# Patient Record
Sex: MALE | Race: WHITE | URBAN - METROPOLITAN AREA
[De-identification: names, ages, dates, MRNs, and addresses within clinical notes are randomized per-mention and may not be internally consistent; named-entity substitution may affect disease eponyms.]

---

## 2020-12-07 ENCOUNTER — TELEPHONE (OUTPATIENT)
Dept: FAMILY MEDICINE CLINIC | Age: 65
End: 2020-12-07

## 2020-12-07 NOTE — TELEPHONE ENCOUNTER
Pt calling back he is requesting the results be sent to     [address] [facility] [address], [address] [facility]

## 2020-12-07 NOTE — TELEPHONE ENCOUNTER
CALLED PT IN REGARDS TO POS FIT TEST RESULTS WE RECEIVED FROM [facility]. HE IS NO LONGER A PATIENT HERE.  I DID LEAVE HIM VM BECAUSE THE RESULTS ARE POSITIVE AND HE SHOULD FOLLOW UP WITH SOMEONE.KW